# Patient Record
Sex: FEMALE | Race: WHITE | Employment: OTHER | URBAN - METROPOLITAN AREA
[De-identification: names, ages, dates, MRNs, and addresses within clinical notes are randomized per-mention and may not be internally consistent; named-entity substitution may affect disease eponyms.]

---

## 2018-04-25 ENCOUNTER — OFFICE VISIT (OUTPATIENT)
Dept: NON INVASIVE DIAGNOSTICS | Age: 73
End: 2018-04-25
Payer: MEDICARE

## 2018-04-25 ENCOUNTER — TELEPHONE (OUTPATIENT)
Dept: NON INVASIVE DIAGNOSTICS | Age: 73
End: 2018-04-25

## 2018-04-25 VITALS
DIASTOLIC BLOOD PRESSURE: 64 MMHG | HEIGHT: 67 IN | BODY MASS INDEX: 32.18 KG/M2 | SYSTOLIC BLOOD PRESSURE: 120 MMHG | HEART RATE: 74 BPM | WEIGHT: 205 LBS | RESPIRATION RATE: 18 BRPM

## 2018-04-25 DIAGNOSIS — Z95.0 PACEMAKER: Primary | ICD-10-CM

## 2018-04-25 DIAGNOSIS — I48.0 PAROXYSMAL ATRIAL FIBRILLATION (HCC): ICD-10-CM

## 2018-04-25 PROCEDURE — 99213 OFFICE O/P EST LOW 20 MIN: CPT | Performed by: NURSE PRACTITIONER

## 2018-04-25 PROCEDURE — 93280 PM DEVICE PROGR EVAL DUAL: CPT | Performed by: INTERNAL MEDICINE

## 2018-10-26 ENCOUNTER — TELEPHONE (OUTPATIENT)
Dept: NON INVASIVE DIAGNOSTICS | Age: 73
End: 2018-10-26

## 2018-10-26 ENCOUNTER — NURSE ONLY (OUTPATIENT)
Dept: NON INVASIVE DIAGNOSTICS | Age: 73
End: 2018-10-26
Payer: MEDICARE

## 2018-10-26 DIAGNOSIS — I44.2 CHB (COMPLETE HEART BLOCK) (HCC): ICD-10-CM

## 2018-10-26 DIAGNOSIS — Z95.0 PACEMAKER: ICD-10-CM

## 2018-10-26 DIAGNOSIS — I48.0 PAROXYSMAL ATRIAL FIBRILLATION (HCC): Primary | ICD-10-CM

## 2018-10-26 PROCEDURE — 93296 REM INTERROG EVL PM/IDS: CPT | Performed by: INTERNAL MEDICINE

## 2018-10-26 PROCEDURE — 93294 REM INTERROG EVL PM/LDLS PM: CPT | Performed by: INTERNAL MEDICINE

## 2018-10-26 NOTE — TELEPHONE ENCOUNTER
Patients  called for notification that he sent [facility] remote for wife. Advised it was received - next transmission 1-28-19 .  After review, if any other notifications to patient- call [address] per  request.

## 2018-10-29 NOTE — PROGRESS NOTES
See [facility] [place] report. Remote monitoring reviewed over a 90 day period. End of 90 day monitoring period date of service 10-26-18. Liana Stallworth

## 2019-01-28 ENCOUNTER — NURSE ONLY (OUTPATIENT)
Dept: NON INVASIVE DIAGNOSTICS | Age: 74
End: 2019-01-28

## 2019-01-30 ENCOUNTER — NURSE ONLY (OUTPATIENT)
Dept: NON INVASIVE DIAGNOSTICS | Age: 74
End: 2019-01-30
Payer: MEDICARE

## 2019-01-30 DIAGNOSIS — Z95.0 PACEMAKER: ICD-10-CM

## 2019-01-30 DIAGNOSIS — I44.2 CHB (COMPLETE HEART BLOCK) (HCC): Primary | ICD-10-CM

## 2019-01-30 PROCEDURE — 93296 REM INTERROG EVL PM/IDS: CPT | Performed by: INTERNAL MEDICINE

## 2019-01-30 PROCEDURE — 93294 REM INTERROG EVL PM/LDLS PM: CPT | Performed by: INTERNAL MEDICINE

## 2019-05-20 ENCOUNTER — NURSE ONLY (OUTPATIENT)
Dept: NON INVASIVE DIAGNOSTICS | Age: 74
End: 2019-05-20
Payer: MEDICARE

## 2019-05-20 DIAGNOSIS — Z95.0 PACEMAKER: Primary | ICD-10-CM

## 2019-05-20 DIAGNOSIS — I44.2 CHB (COMPLETE HEART BLOCK) (HCC): ICD-10-CM

## 2019-05-20 PROCEDURE — 93294 REM INTERROG EVL PM/LDLS PM: CPT | Performed by: INTERNAL MEDICINE

## 2019-05-20 PROCEDURE — 93296 REM INTERROG EVL PM/IDS: CPT | Performed by: INTERNAL MEDICINE

## 2019-06-05 ENCOUNTER — TELEPHONE (OUTPATIENT)
Dept: NON INVASIVE DIAGNOSTICS | Age: 74
End: 2019-06-05

## 2019-06-05 NOTE — TELEPHONE ENCOUNTER
We have received your remote transmission. Our staff will contact you if there is anything that needs to be discussed.  Your next appointment is 08/19/2019 remote transmission from home    Pt is non-wireless

## 2019-06-05 NOTE — PROGRESS NOTES
See [facility] [place] report. Remote monitoring reviewed over a 90 day period. End of 90 day monitoring period date of service 5-20-19.

## 2019-08-26 ENCOUNTER — TELEPHONE (OUTPATIENT)
Dept: NON INVASIVE DIAGNOSTICS | Age: 74
End: 2019-08-26

## 2019-08-26 ENCOUNTER — NURSE ONLY (OUTPATIENT)
Dept: NON INVASIVE DIAGNOSTICS | Age: 74
End: 2019-08-26
Payer: MEDICARE

## 2019-08-26 DIAGNOSIS — I44.2 CHB (COMPLETE HEART BLOCK) (HCC): ICD-10-CM

## 2019-08-26 DIAGNOSIS — Z95.0 PACEMAKER: Primary | ICD-10-CM

## 2019-08-26 PROCEDURE — 93294 REM INTERROG EVL PM/LDLS PM: CPT | Performed by: INTERNAL MEDICINE

## 2019-08-26 PROCEDURE — 93296 REM INTERROG EVL PM/IDS: CPT | Performed by: INTERNAL MEDICINE

## 2019-08-26 NOTE — TELEPHONE ENCOUNTER
Spoke to the nurse at [facility] about her missed transmission on 08/19/2019 and she said that she would try sending it today

## 2019-09-12 ENCOUNTER — TELEPHONE (OUTPATIENT)
Dept: NON INVASIVE DIAGNOSTICS | Age: 74
End: 2019-09-12

## 2019-10-08 ENCOUNTER — OFFICE VISIT (OUTPATIENT)
Dept: NON INVASIVE DIAGNOSTICS | Age: 74
End: 2019-10-08
Payer: MEDICARE

## 2019-10-08 VITALS
HEIGHT: 67 IN | DIASTOLIC BLOOD PRESSURE: 60 MMHG | HEART RATE: 61 BPM | SYSTOLIC BLOOD PRESSURE: 106 MMHG | BODY MASS INDEX: 32.11 KG/M2 | RESPIRATION RATE: 18 BRPM

## 2019-10-08 DIAGNOSIS — I48.0 PAROXYSMAL ATRIAL FIBRILLATION (HCC): ICD-10-CM

## 2019-10-08 DIAGNOSIS — I44.2 CHB (COMPLETE HEART BLOCK) (HCC): ICD-10-CM

## 2019-10-08 DIAGNOSIS — Z95.0 PACEMAKER: Primary | ICD-10-CM

## 2019-10-08 PROCEDURE — 99214 OFFICE O/P EST MOD 30 MIN: CPT | Performed by: INTERNAL MEDICINE

## 2019-10-08 PROCEDURE — 93280 PM DEVICE PROGR EVAL DUAL: CPT | Performed by: INTERNAL MEDICINE

## 2019-10-10 ENCOUNTER — TELEPHONE (OUTPATIENT)
Dept: ADMINISTRATIVE | Age: 74
End: 2019-10-10

## 2019-10-29 ENCOUNTER — TELEPHONE (OUTPATIENT)
Dept: NON INVASIVE DIAGNOSTICS | Age: 74
End: 2019-10-29

## 2019-10-30 ENCOUNTER — ANESTHESIA (OUTPATIENT)
Dept: CARDIAC CATH/INVASIVE PROCEDURES | Age: 74
End: 2019-10-30

## 2019-10-30 ENCOUNTER — ANESTHESIA EVENT (OUTPATIENT)
Dept: CARDIAC CATH/INVASIVE PROCEDURES | Age: 74
End: 2019-10-30

## 2019-10-30 ENCOUNTER — HOSPITAL ENCOUNTER (OUTPATIENT)
Dept: CARDIAC CATH/INVASIVE PROCEDURES | Age: 74
Discharge: HOME | End: 2019-10-30
Payer: MEDICARE

## 2019-10-30 VITALS — OXYGEN SATURATION: 98 % | SYSTOLIC BLOOD PRESSURE: 141 MMHG | DIASTOLIC BLOOD PRESSURE: 59 MMHG

## 2019-10-30 VITALS
BODY MASS INDEX: 34.21 KG/M2 | RESPIRATION RATE: 20 BRPM | HEIGHT: 67 IN | TEMPERATURE: 97.1 F | HEART RATE: 65 BPM | WEIGHT: 218 LBS | DIASTOLIC BLOOD PRESSURE: 55 MMHG | SYSTOLIC BLOOD PRESSURE: 138 MMHG

## 2019-10-30 DIAGNOSIS — Z45.010 ENCOUNTER FOR PACEMAKER AT END OF BATTERY LIFE: ICD-10-CM

## 2019-10-30 LAB
ANION GAP SERPL CALCULATED.3IONS-SCNC: 9 MMOL/L (ref 7–16)
BASOPHILS ABSOLUTE: 0.05 E9/L (ref 0–0.2)
BASOPHILS RELATIVE PERCENT: 1 % (ref 0–2)
BUN BLDV-MCNC: 17 MG/DL (ref 8–23)
CALCIUM SERPL-MCNC: 9.4 MG/DL (ref 8.6–10.2)
CHLORIDE BLD-SCNC: 101 MMOL/L (ref 98–107)
CO2: 30 MMOL/L (ref 22–29)
CREAT SERPL-MCNC: 0.6 MG/DL (ref 0.5–1)
EOSINOPHILS ABSOLUTE: 0.22 E9/L (ref 0.05–0.5)
EOSINOPHILS RELATIVE PERCENT: 4.2 % (ref 0–6)
GFR AFRICAN AMERICAN: >60
GFR NON-AFRICAN AMERICAN: >60 ML/MIN/1.73
GLUCOSE BLD-MCNC: 103 MG/DL (ref 74–99)
HCT VFR BLD CALC: 43.8 % (ref 34–48)
HEMOGLOBIN: 13.9 G/DL (ref 11.5–15.5)
IMMATURE GRANULOCYTES #: 0.01 E9/L
IMMATURE GRANULOCYTES %: 0.2 % (ref 0–5)
INR BLD: 2.1
LYMPHOCYTES ABSOLUTE: 1.62 E9/L (ref 1.5–4)
LYMPHOCYTES RELATIVE PERCENT: 31.3 % (ref 20–42)
MCH RBC QN AUTO: 30.6 PG (ref 26–35)
MCHC RBC AUTO-ENTMCNC: 31.7 % (ref 32–34.5)
MCV RBC AUTO: 96.5 FL (ref 80–99.9)
MONOCYTES ABSOLUTE: 0.37 E9/L (ref 0.1–0.95)
MONOCYTES RELATIVE PERCENT: 7.1 % (ref 2–12)
NEUTROPHILS ABSOLUTE: 2.91 E9/L (ref 1.8–7.3)
NEUTROPHILS RELATIVE PERCENT: 56.2 % (ref 43–80)
PDW BLD-RTO: 12.7 FL (ref 11.5–15)
PLATELET # BLD: 243 E9/L (ref 130–450)
PMV BLD AUTO: 10.6 FL (ref 7–12)
POTASSIUM SERPL-SCNC: 4 MMOL/L (ref 3.5–5)
PROTHROMBIN TIME: 24.5 SEC (ref 9.3–12.4)
RBC # BLD: 4.54 E12/L (ref 3.5–5.5)
SODIUM BLD-SCNC: 140 MMOL/L (ref 132–146)
WBC # BLD: 5.2 E9/L (ref 4.5–11.5)

## 2019-10-30 PROCEDURE — 33228 REMV&REPLC PM GEN DUAL LEAD: CPT | Performed by: INTERNAL MEDICINE

## 2019-10-30 PROCEDURE — C1785 PMKR, DUAL, RATE-RESP: HCPCS

## 2019-10-30 PROCEDURE — 2780000010 HC IMPLANT OTHER

## 2019-10-30 PROCEDURE — 85610 PROTHROMBIN TIME: CPT

## 2019-10-30 PROCEDURE — 85025 COMPLETE CBC W/AUTO DIFF WBC: CPT

## 2019-10-30 PROCEDURE — 6360000002 HC RX W HCPCS

## 2019-10-30 PROCEDURE — 3700000000 HC ANESTHESIA ATTENDED CARE

## 2019-10-30 PROCEDURE — 2709999900 HC NON-CHARGEABLE SUPPLY

## 2019-10-30 PROCEDURE — 2580000003 HC RX 258: Performed by: INTERNAL MEDICINE

## 2019-10-30 PROCEDURE — 36415 COLL VENOUS BLD VENIPUNCTURE: CPT

## 2019-10-30 PROCEDURE — C1732 CATH, EP, DIAG/ABL, 3D/VECT: HCPCS

## 2019-10-30 PROCEDURE — 80048 BASIC METABOLIC PNL TOTAL CA: CPT

## 2019-10-30 PROCEDURE — 2580000003 HC RX 258

## 2019-10-30 PROCEDURE — 3700000001 HC ADD 15 MINUTES (ANESTHESIA)

## 2019-10-30 PROCEDURE — 2500000003 HC RX 250 WO HCPCS

## 2019-10-30 PROCEDURE — 6360000002 HC RX W HCPCS: Performed by: NURSE ANESTHETIST, CERTIFIED REGISTERED

## 2019-10-30 PROCEDURE — 2720000010 HC SURG SUPPLY STERILE

## 2019-10-30 RX ORDER — SODIUM CHLORIDE 0.9 % (FLUSH) 0.9 %
10 SYRINGE (ML) INJECTION EVERY 12 HOURS SCHEDULED
Status: CANCELLED | OUTPATIENT
Start: 2019-10-30

## 2019-10-30 RX ORDER — CEFAZOLIN SODIUM 1 G/3ML
INJECTION, POWDER, FOR SOLUTION INTRAMUSCULAR; INTRAVENOUS PRN
Status: DISCONTINUED | OUTPATIENT
Start: 2019-10-30 | End: 2019-10-30 | Stop reason: SDUPTHER

## 2019-10-30 RX ORDER — CEPHALEXIN 500 MG/1
500 CAPSULE ORAL 2 TIMES DAILY
Qty: 14 CAPSULE | Refills: 0 | Status: SHIPPED | OUTPATIENT
Start: 2019-10-30 | End: 2019-11-06

## 2019-10-30 RX ORDER — PHENYLEPHRINE HYDROCHLORIDE 10 MG/ML
INJECTION INTRAVENOUS PRN
Status: DISCONTINUED | OUTPATIENT
Start: 2019-10-30 | End: 2019-10-30 | Stop reason: SDUPTHER

## 2019-10-30 RX ORDER — PROPOFOL 10 MG/ML
INJECTION, EMULSION INTRAVENOUS CONTINUOUS PRN
Status: DISCONTINUED | OUTPATIENT
Start: 2019-10-30 | End: 2019-10-30 | Stop reason: SDUPTHER

## 2019-10-30 RX ORDER — SODIUM CHLORIDE 9 MG/ML
INJECTION, SOLUTION INTRAVENOUS ONCE
Status: COMPLETED | OUTPATIENT
Start: 2019-10-30 | End: 2019-10-30

## 2019-10-30 RX ORDER — ACETAMINOPHEN 325 MG/1
650 TABLET ORAL EVERY 4 HOURS PRN
Status: CANCELLED | OUTPATIENT
Start: 2019-10-30

## 2019-10-30 RX ORDER — SODIUM CHLORIDE 0.9 % (FLUSH) 0.9 %
10 SYRINGE (ML) INJECTION PRN
Status: CANCELLED | OUTPATIENT
Start: 2019-10-30

## 2019-10-30 RX ORDER — FENTANYL CITRATE 50 UG/ML
INJECTION, SOLUTION INTRAMUSCULAR; INTRAVENOUS PRN
Status: DISCONTINUED | OUTPATIENT
Start: 2019-10-30 | End: 2019-10-30 | Stop reason: SDUPTHER

## 2019-10-30 RX ADMIN — PHENYLEPHRINE HYDROCHLORIDE 50 MCG: 10 INJECTION INTRAVENOUS at 11:42

## 2019-10-30 RX ADMIN — CEFAZOLIN 2000 MG: 1 INJECTION, POWDER, FOR SOLUTION INTRAMUSCULAR; INTRAVENOUS at 11:23

## 2019-10-30 RX ADMIN — FENTANYL CITRATE 25 MCG: 50 INJECTION, SOLUTION INTRAMUSCULAR; INTRAVENOUS at 11:30

## 2019-10-30 RX ADMIN — PHENYLEPHRINE HYDROCHLORIDE 100 MCG: 10 INJECTION INTRAVENOUS at 12:15

## 2019-10-30 RX ADMIN — PHENYLEPHRINE HYDROCHLORIDE 100 MCG: 10 INJECTION INTRAVENOUS at 12:03

## 2019-10-30 RX ADMIN — FENTANYL CITRATE 50 MCG: 50 INJECTION, SOLUTION INTRAMUSCULAR; INTRAVENOUS at 11:20

## 2019-10-30 RX ADMIN — PROPOFOL 20 MCG/KG/MIN: 10 INJECTION, EMULSION INTRAVENOUS at 11:20

## 2019-10-30 RX ADMIN — SODIUM CHLORIDE: 9 INJECTION, SOLUTION INTRAVENOUS at 09:03

## 2019-10-30 RX ADMIN — PHENYLEPHRINE HYDROCHLORIDE 50 MCG: 10 INJECTION INTRAVENOUS at 11:45

## 2019-10-30 ASSESSMENT — PULMONARY FUNCTION TESTS
PIF_VALUE: 11
PIF_VALUE: 0
PIF_VALUE: 11
PIF_VALUE: 12
PIF_VALUE: 11
PIF_VALUE: 12
PIF_VALUE: 1
PIF_VALUE: 0
PIF_VALUE: 11
PIF_VALUE: 12
PIF_VALUE: 11
PIF_VALUE: 12
PIF_VALUE: 11
PIF_VALUE: 12
PIF_VALUE: 11
PIF_VALUE: 12
PIF_VALUE: 11
PIF_VALUE: 11

## 2019-11-01 ENCOUNTER — TELEPHONE (OUTPATIENT)
Dept: CARDIOLOGY | Age: 74
End: 2019-11-01

## 2019-11-13 ENCOUNTER — NURSE ONLY (OUTPATIENT)
Dept: NON INVASIVE DIAGNOSTICS | Age: 74
End: 2019-11-13
Payer: MEDICARE

## 2019-11-13 DIAGNOSIS — Z95.0 PACEMAKER: Primary | ICD-10-CM

## 2019-11-13 DIAGNOSIS — I44.2 CHB (COMPLETE HEART BLOCK) (HCC): ICD-10-CM

## 2019-11-13 PROCEDURE — 93280 PM DEVICE PROGR EVAL DUAL: CPT | Performed by: INTERNAL MEDICINE

## 2019-12-03 ENCOUNTER — NURSE ONLY (OUTPATIENT)
Dept: NON INVASIVE DIAGNOSTICS | Age: 74
End: 2019-12-03

## 2020-02-17 ENCOUNTER — NURSE ONLY (OUTPATIENT)
Dept: NON INVASIVE DIAGNOSTICS | Age: 75
End: 2020-02-17
Payer: MEDICARE

## 2020-02-17 PROCEDURE — 93296 REM INTERROG EVL PM/IDS: CPT | Performed by: INTERNAL MEDICINE

## 2020-02-17 PROCEDURE — 93294 REM INTERROG EVL PM/LDLS PM: CPT | Performed by: INTERNAL MEDICINE

## 2020-02-21 NOTE — PROGRESS NOTES
See [facility] [place] report. Remote monitoring reviewed over a 90 day period. End of 90 day monitoring period date of service 2.18.2020.

## 2020-05-19 ENCOUNTER — NURSE ONLY (OUTPATIENT)
Dept: NON INVASIVE DIAGNOSTICS | Age: 75
End: 2020-05-19
Payer: MEDICARE

## 2020-05-19 PROCEDURE — 93294 REM INTERROG EVL PM/LDLS PM: CPT | Performed by: INTERNAL MEDICINE

## 2020-05-19 PROCEDURE — 93296 REM INTERROG EVL PM/IDS: CPT | Performed by: INTERNAL MEDICINE

## 2020-07-15 ENCOUNTER — TELEPHONE (OUTPATIENT)
Dept: ADMINISTRATIVE | Age: 75
End: 2020-07-15

## 2020-07-15 NOTE — TELEPHONE ENCOUNTER
[address] calling about pt, she was scheduled for appt w/ Dr Bryan Dao 7/27, would like to know is VV would be appropriate for this pt, call FELIPA Martinez at [phone] with questions or to schedule

## 2020-07-29 NOTE — TELEPHONE ENCOUNTER
Returned a call to [employer]. They would like to schedule a VV with Dr. Diallo Vick. I had to leave a message with the  for some to call back us back.

## 2020-08-18 ENCOUNTER — NURSE ONLY (OUTPATIENT)
Dept: NON INVASIVE DIAGNOSTICS | Age: 75
End: 2020-08-18
Payer: MEDICARE

## 2020-08-18 PROCEDURE — 93294 REM INTERROG EVL PM/LDLS PM: CPT | Performed by: INTERNAL MEDICINE

## 2020-08-18 PROCEDURE — 93296 REM INTERROG EVL PM/IDS: CPT | Performed by: INTERNAL MEDICINE

## 2020-08-18 NOTE — PROGRESS NOTES
See [facility] [place] report. Remote monitoring reviewed over a 90 day period.   End of 90 day monitoring period date of service 08/18/2020

## 2020-11-17 ENCOUNTER — NURSE ONLY (OUTPATIENT)
Dept: NON INVASIVE DIAGNOSTICS | Age: 75
End: 2020-11-17
Payer: MEDICARE

## 2020-11-17 PROCEDURE — 93296 REM INTERROG EVL PM/IDS: CPT | Performed by: INTERNAL MEDICINE

## 2020-11-17 PROCEDURE — 93294 REM INTERROG EVL PM/LDLS PM: CPT | Performed by: INTERNAL MEDICINE

## 2021-02-16 ENCOUNTER — NURSE ONLY (OUTPATIENT)
Dept: NON INVASIVE DIAGNOSTICS | Age: 76
End: 2021-02-16
Payer: MEDICARE

## 2021-02-16 DIAGNOSIS — I44.2 CHB (COMPLETE HEART BLOCK) (HCC): ICD-10-CM

## 2021-02-16 DIAGNOSIS — I48.0 PAROXYSMAL ATRIAL FIBRILLATION (HCC): ICD-10-CM

## 2021-02-16 DIAGNOSIS — Z95.0 PACEMAKER: Primary | ICD-10-CM

## 2021-02-16 PROCEDURE — 93294 REM INTERROG EVL PM/LDLS PM: CPT | Performed by: INTERNAL MEDICINE

## 2021-02-16 PROCEDURE — 93296 REM INTERROG EVL PM/IDS: CPT | Performed by: INTERNAL MEDICINE

## 2021-03-04 NOTE — PROGRESS NOTES
See [facility] [place] report. Remote monitoring reviewed over a 90 day period. End of 90 day monitoring period date of service 2.16.2021.

## 2021-05-18 ENCOUNTER — NURSE ONLY (OUTPATIENT)
Dept: NON INVASIVE DIAGNOSTICS | Age: 76
End: 2021-05-18
Payer: MEDICARE

## 2021-05-18 DIAGNOSIS — Z95.0 PACEMAKER: ICD-10-CM

## 2021-05-18 DIAGNOSIS — I44.2 CHB (COMPLETE HEART BLOCK) (HCC): Primary | ICD-10-CM

## 2021-06-30 NOTE — PROGRESS NOTES
Remote transmission results:    Remote monitoring reviewed over a 90 day period.   End of 90 day monitoring period date of service 5/18/21      Make/Model MDT Isis MOLINA DR MRI  Mode DDIR 60/120 ppm  Presenting rhythm: SR, ApVp, HR 60 ppm  P wave: 1.1 mV  Impedance: 380 ohms   Threshold: not measured  RV R wave: paced   ms mV  Impedance: 513 ohms   Threshold: 0.875 V @ 0.4 ms  Pacing: A: 93.5%  RV: 99.8%    Battery Voltage/Longevity:  8.6 years    Arrhythmias: AF burden 2.1%  -5/08/21: PAF throughout day VHR 60-78 bpm  -5/09/21: AF most of day VHR 60s bpm  -5/10/21: AF 11 hours VHR 60s bpm    Medications  Flecainide 50 mg BID  Lopressor 100 mg BID  Norvasc 10 mg QD  Lasix 40 mg BID  Apresoline 25 mg Q8  Hours  Lisinopril 40 mg QD  Coumadin as directed    Comment  [facility] resident (?)  Persistent AF  H/O Tikosyn, amiodarone, Rythmol intolerance  On Flecainide AAD therapy  CHB  EP outpatient follow-up pending  Remote 8/17/21    ISIDRO Kramer-CNP, [address] Electrophysiology  [address] [facility]

## 2021-07-01 ENCOUNTER — TELEPHONE (OUTPATIENT)
Dept: NON INVASIVE DIAGNOSTICS | Age: 76
End: 2021-07-01

## 2021-07-06 PROCEDURE — 93294 REM INTERROG EVL PM/LDLS PM: CPT | Performed by: INTERNAL MEDICINE

## 2021-07-06 PROCEDURE — 93296 REM INTERROG EVL PM/IDS: CPT | Performed by: INTERNAL MEDICINE

## 2021-08-10 ENCOUNTER — VIRTUAL VISIT (OUTPATIENT)
Dept: NON INVASIVE DIAGNOSTICS | Age: 76
End: 2021-08-10
Payer: MEDICARE

## 2021-08-10 DIAGNOSIS — E66.8 MODERATE OBESITY: ICD-10-CM

## 2021-08-10 DIAGNOSIS — Z95.0 PACEMAKER: Primary | ICD-10-CM

## 2021-08-10 DIAGNOSIS — I44.2 CHB (COMPLETE HEART BLOCK) (HCC): ICD-10-CM

## 2021-08-10 DIAGNOSIS — R94.31 ABNORMAL EKG: ICD-10-CM

## 2021-08-10 DIAGNOSIS — I48.0 PAROXYSMAL ATRIAL FIBRILLATION (HCC): ICD-10-CM

## 2021-08-10 PROBLEM — J96.01 ACUTE RESPIRATORY FAILURE WITH HYPOXIA (HCC): Status: ACTIVE | Noted: 2017-06-17

## 2021-08-10 PROBLEM — I82.409 DVT (DEEP VENOUS THROMBOSIS) (HCC): Status: ACTIVE | Noted: 2017-06-11

## 2021-08-10 PROBLEM — A04.72 C. DIFFICILE DIARRHEA: Status: ACTIVE | Noted: 2017-06-11

## 2021-08-10 PROCEDURE — 99214 OFFICE O/P EST MOD 30 MIN: CPT | Performed by: INTERNAL MEDICINE

## 2021-08-10 PROCEDURE — 93280 PM DEVICE PROGR EVAL DUAL: CPT | Performed by: INTERNAL MEDICINE

## 2021-08-10 RX ORDER — HYDRALAZINE HYDROCHLORIDE 50 MG/1
TABLET, FILM COATED ORAL
COMMUNITY
Start: 2021-07-31

## 2021-08-10 RX ORDER — WARFARIN SODIUM 3 MG/1
TABLET ORAL
COMMUNITY
Start: 2021-07-21

## 2021-08-10 NOTE — PROGRESS NOTES
8/10/2021    TELEHEALTH EVALUATION -- Audio/Visual (During UCFMI-09 public health emergency)    HPI:    Bebo Samuels (:  1945) has requested an audio/video evaluation for the following concern(s): Pacemaker    She has hx of CHB, persistent AF. She has D-PPM s/p generator change in 2019. She has been tikosyn, amiodarone and propafenone intolerant, current flecainide AAD therapy. She had an AF ablation procedure at [facility] in 2015. She resides at [facility]  And does remote transmissions. She remains on coumadin for [facility] with no reported bleeding issues. Her family was present during interview.       She denies any chest pain, sob, dizziness, syncope. Review of Systems     General: No unusual weight gain, change in exercise tolerance  Skin: No rash or itching  EENT: No vision changes or nosebleeds  Cardiovascular: No orthopnea or paroxysmal nocturnal dyspnea, neg chest pain, palpitation  Respiratory: neg Cough  and sob  Gastrointestinal: No hematemesis or recent changes in bowel habits  Genitourinary: No hematuria, urgency or frequency  Musculoskeletal: No muscular weakness or joint swelling   Neurologic / Psychiatric: No incoordination or convulsions  Allergic / Immunologic/ Lymphatic / Endocrine: No anemia or bleeding tendency      Prior to Visit Medications    Medication Sig Taking?  Authorizing Provider   hydrALAZINE (APRESOLINE) 50 MG tablet  Yes Historical Provider, MD   warfarin (COUMADIN) 3 MG tablet  Yes Historical Provider, MD   magnesium hydroxide (MILK OF MAGNESIA) 400 MG/5ML suspension Take by mouth daily as needed for Constipation Yes Historical Provider, MD   camphor-menthol-methyl salicylate (SUSHANT HOYOS ULTRA STRENGTH) 4-10-30 % CREA cream Apply topically 3 times daily as needed for Pain Yes Historical Provider, MD   menthol-methyl salicylate (BENGAY ARTHRITIS) 8-30 % CREA cream Apply 1 applicator topically every 6 hours as needed for Pain Yes Historical Provider, MD gabapentin (NEURONTIN) 300 MG capsule Take 300 mg by mouth nightly Yes Historical Provider, MD   loratadine (CLARITIN) 10 MG capsule Take 10 mg by mouth daily Yes Historical Provider, MD   insulin NPH (HUMULIN N;NOVOLIN N) 100 UNIT/ML injection vial Inject into the skin 4 times daily (after meals and at bedtime) 160 -200 =2 units 201-250 = 4   251-300 = 6   301-350 = 8  351- 400 = 10 Yes Historical Provider, MD   acetaminophen (TYLENOL) 325 MG tablet 650 mg by Per G Tube route every 4 hours as needed for Pain or Fever Yes Historical Provider, MD   insulin glargine (LANTUS) 100 UNIT/ML injection vial Inject 30 Units into the skin nightly  Patient taking differently: Inject 15 Units into the skin nightly  Yes Erwin Walsh MD   hydrALAZINE (APRESOLINE) 25 MG tablet Take 1 tablet by mouth every 8 hours  Patient taking differently: 75 mg by Per G Tube route every 8 hours  Yes Erwin Walsh MD   metoprolol tartrate (LOPRESSOR) 100 MG tablet 1 tablet by PEG Tube route 2 times daily Yes Erwin Walsh MD   amLODIPine (NORVASC) 10 MG tablet 1 tablet by PEG Tube route daily Yes Erwin Walsh MD   furosemide (LASIX) 20 MG tablet Take 1 tablet by mouth 2 times daily  Patient taking differently: 40 mg by Per G Tube route 2 times daily  Yes Erwin Walsh MD   Multiple Vitamins-Minerals (THERAPEUTIC MULTIVITAMIN-MINERALS) tablet 1 tablet by Per G Tube route daily  Yes Historical Provider, MD   flecainide (TAMBOCOR) 50 MG tablet Take 1 tablet by mouth 2 times daily  Patient taking differently: 50 mg by Per G Tube route 2 times daily  Yes Shannan Ag DO   lisinopril (PRINIVIL;ZESTRIL) 40 MG tablet 40 mg by Per G Tube route daily  Yes Historical Provider, MD   lovastatin (MEVACOR) 40 MG tablet 40 mg by Per G Tube route nightly  Yes Historical Provider, MD   Menthol-Zinc Oxide (CHAMOSYN) 0.45-20 % OINT Apply topically  Patient not taking: Reported on 8/10/2021  Historical Provider, MD TeleHealth encounter (During EDCCA-98 public health emergency), evaluation of the following organ systems was limited: Vitals/Constitutional/EENT/Resp/CV/GI//MS/Neuro/Skin/Heme-Lymph-Imm. Pursuant to the emergency declaration under the [address] [place] [address], [address] [place] [address] and the [employer] and [employer] Act, this Virtual Visit was conducted with patient's (and/or legal guardian's) consent, to reduce the patient's risk of exposure to COVID-19 and provide necessary medical care. The patient (and/or legal guardian) has also been advised to contact this office for worsening conditions or problems, and seek emergency medical treatment and/or call 911 if deemed necessary. Services were provided through a phone/video synchronous discussion virtually to substitute for in-person clinic visit. Patient and provider were located at their individual homes. --Papa Radford MD on 8/10/2021 at 11:12 AM    An electronic signature was used to authenticate this note.

## 2023-07-10 NOTE — PROGRESS NOTES
[facility] CARDIOLOGY  CARDIAC ELECTROPHYSIOLOGY DEPARTMENT/DIVISION OF CARDIOLOGY  Outpatient Progress Report  PATIENT: [id number] RECORD NUMBER: [id number]  DATE OF SERVICE:  7/10/2023  ATTENDING ELECTROPHYSIOLOGIST:  Wilda Chapman D.O.  REFERRING PHYSICIAN: No ref. provider found and Sayda Fatima DO  CHIEF COMPLAINT: PPM insitu    HPI: Shalini Kelly is a 70 y.o. 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 She is managed by PCP with bumex 2 mg daily, flecainide 50 mg BID, lovastatin 40 mg daily, and VKA. In 2009, she was diagnosed with 3* AV block, which was treated with MDT dc PPM at [facility]. In 2013 or earlier, she was diagnosed with AF, which was treated with [address] and tikosyn. She was reportedly intolerant to propafenone and amiodarone. In 2015, she had AF cryoablation at [facility]. LSPV: -57 (180s)   LIPV: -52 (180s), -44 (180s)   RIPV: -45 (156s)   RSPV: -46 (180s)   At the time of ablation, she was noted to have extensive scarring of the LA. After cryo PVI, patient had AFL, which was treated with posterior wall line. Additionally, the LIPV was noted to recover conduction and was treated with RFA. In 2016, Lexus Paramjit stopped due to Qtc prolongation and flecainide started. In 2017, her PPM was reprogrammed from DDDR to [address] due to avoid tracking of atrial tachycardia. Around that time, she had a stroke and has been wheelchair bound since. She resides at [facility]. She presents today, 7/14/23; to transfer EP care to my office.  Her device is enrolled in remote monitoring, which most recently reported RA pacing = 48.33%, RV pacing = 96.88%, and

## 2023-07-14 ENCOUNTER — OFFICE VISIT (OUTPATIENT)
Dept: NON INVASIVE DIAGNOSTICS | Age: 78
End: 2023-07-14

## 2023-07-14 VITALS
BODY MASS INDEX: 32.74 KG/M2 | DIASTOLIC BLOOD PRESSURE: 72 MMHG | RESPIRATION RATE: 16 BRPM | WEIGHT: 208.6 LBS | HEIGHT: 67 IN | SYSTOLIC BLOOD PRESSURE: 112 MMHG

## 2023-07-14 DIAGNOSIS — I44.2 CHB (COMPLETE HEART BLOCK) (HCC): ICD-10-CM

## 2023-07-14 DIAGNOSIS — Z95.0 PACEMAKER: Primary | ICD-10-CM

## 2023-07-14 DIAGNOSIS — I48.0 PAROXYSMAL ATRIAL FIBRILLATION (HCC): ICD-10-CM

## 2023-07-14 PROBLEM — I69.30 HISTORY OF CEREBROVASCULAR ACCIDENT (CVA) WITH RESIDUAL DEFICIT: Status: ACTIVE | Noted: 2023-07-14

## 2023-07-14 PROBLEM — J98.01 ACUTE BRONCHOSPASM: Status: ACTIVE | Noted: 2023-07-14

## 2023-07-14 PROBLEM — E83.42 HYPOMAGNESEMIA: Status: ACTIVE | Noted: 2023-07-14

## 2023-07-14 PROBLEM — J81.1 PULMONARY EDEMA: Status: ACTIVE | Noted: 2023-07-14

## 2023-07-14 PROBLEM — R93.89 ABNORMAL COMPUTERIZED AXIAL TOMOGRAPHY OF CHEST: Status: ACTIVE | Noted: 2023-07-14

## 2023-07-14 PROBLEM — R82.90 ABNORMAL URINALYSIS: Status: ACTIVE | Noted: 2023-07-14

## 2023-07-14 PROBLEM — D72.829 LEUKOCYTOSIS: Status: ACTIVE | Noted: 2023-07-14

## 2023-07-14 PROBLEM — I50.31 ACUTE DIASTOLIC HEART FAILURE (HCC): Status: ACTIVE | Noted: 2023-07-14

## 2023-07-14 PROBLEM — R41.82 ALTERED MENTAL STATUS: Status: ACTIVE | Noted: 2023-07-14

## 2023-07-14 PROBLEM — S37.009A INJURY OF KIDNEY: Status: ACTIVE | Noted: 2023-07-14

## 2023-07-14 PROBLEM — N39.0 SEPSIS DUE TO URINARY TRACT INFECTION (HCC): Status: ACTIVE | Noted: 2023-07-14

## 2023-07-14 PROBLEM — R50.9 FEVER: Status: ACTIVE | Noted: 2023-07-14

## 2023-07-14 PROBLEM — A41.9 SEPSIS DUE TO URINARY TRACT INFECTION (HCC): Status: ACTIVE | Noted: 2023-07-14

## 2023-07-14 PROBLEM — D68.59 HYPERCOAGULABLE STATE (HCC): Status: ACTIVE | Noted: 2023-07-14

## 2023-07-14 PROBLEM — N39.0 ACUTE URINARY TRACT INFECTION: Status: ACTIVE | Noted: 2023-07-14

## 2023-07-14 PROBLEM — K59.00 CONSTIPATION: Status: ACTIVE | Noted: 2023-07-14

## 2023-07-14 PROBLEM — E55.9 VITAMIN D DEFICIENCY: Status: ACTIVE | Noted: 2023-07-14

## 2023-07-14 PROBLEM — T68.XXXA HYPOTHERMIA: Status: ACTIVE | Noted: 2023-07-14

## 2023-07-14 PROBLEM — E83.39 HYPOPHOSPHATEMIA: Status: ACTIVE | Noted: 2023-07-14

## 2023-07-14 PROBLEM — E87.0 HYPERNATREMIA: Status: ACTIVE | Noted: 2023-07-14

## 2023-07-14 PROBLEM — K52.89 STERCORAL COLITIS: Status: ACTIVE | Noted: 2023-07-14

## 2023-07-14 PROBLEM — R09.02 HYPOXIA: Status: ACTIVE | Noted: 2023-07-14

## 2023-07-14 PROBLEM — H90.3 SENSORINEURAL HEARING LOSS, BILATERAL: Status: ACTIVE | Noted: 2020-10-13

## 2023-07-14 PROBLEM — E83.51 HYPOCALCEMIA: Status: ACTIVE | Noted: 2023-07-14

## 2023-07-14 RX ORDER — LIDOCAINE 4 G/G
1 PATCH TOPICAL DAILY
COMMUNITY

## 2023-07-14 RX ORDER — BUMETANIDE 2 MG/1
2 TABLET ORAL DAILY
COMMUNITY

## 2023-07-14 RX ORDER — CYCLOBENZAPRINE HCL 5 MG
5 TABLET ORAL 2 TIMES DAILY PRN
COMMUNITY

## 2023-07-14 NOTE — PATIENT INSTRUCTIONS
No medication changes at this time. Continue remote monitoring of pacemaker every 91 days. Follow-up with Dr Luiza Nichols office in 1 year.

## 2023-10-14 PROCEDURE — 93296 REM INTERROG EVL PM/IDS: CPT | Performed by: STUDENT IN AN ORGANIZED HEALTH CARE EDUCATION/TRAINING PROGRAM

## 2023-10-14 PROCEDURE — 93294 REM INTERROG EVL PM/LDLS PM: CPT | Performed by: STUDENT IN AN ORGANIZED HEALTH CARE EDUCATION/TRAINING PROGRAM

## 2024-01-13 PROCEDURE — 93294 REM INTERROG EVL PM/LDLS PM: CPT | Performed by: STUDENT IN AN ORGANIZED HEALTH CARE EDUCATION/TRAINING PROGRAM

## 2024-01-13 PROCEDURE — 93296 REM INTERROG EVL PM/IDS: CPT | Performed by: STUDENT IN AN ORGANIZED HEALTH CARE EDUCATION/TRAINING PROGRAM

## 2024-04-26 ENCOUNTER — OFFICE VISIT (OUTPATIENT)
Dept: NON INVASIVE DIAGNOSTICS | Age: 79
End: 2024-04-26
Payer: MEDICARE

## 2024-04-26 VITALS
HEIGHT: 67 IN | DIASTOLIC BLOOD PRESSURE: 60 MMHG | WEIGHT: 208 LBS | RESPIRATION RATE: 16 BRPM | BODY MASS INDEX: 32.65 KG/M2 | HEART RATE: 62 BPM | SYSTOLIC BLOOD PRESSURE: 100 MMHG

## 2024-04-26 DIAGNOSIS — I48.0 PAROXYSMAL ATRIAL FIBRILLATION (HCC): Primary | ICD-10-CM

## 2024-04-26 PROCEDURE — 99214 OFFICE O/P EST MOD 30 MIN: CPT | Performed by: STUDENT IN AN ORGANIZED HEALTH CARE EDUCATION/TRAINING PROGRAM

## 2024-04-26 PROCEDURE — 3078F DIAST BP <80 MM HG: CPT | Performed by: STUDENT IN AN ORGANIZED HEALTH CARE EDUCATION/TRAINING PROGRAM

## 2024-04-26 PROCEDURE — 93000 ELECTROCARDIOGRAM COMPLETE: CPT | Performed by: STUDENT IN AN ORGANIZED HEALTH CARE EDUCATION/TRAINING PROGRAM

## 2024-04-26 PROCEDURE — G8400 PT W/DXA NO RESULTS DOC: HCPCS | Performed by: STUDENT IN AN ORGANIZED HEALTH CARE EDUCATION/TRAINING PROGRAM

## 2024-04-26 PROCEDURE — 3074F SYST BP LT 130 MM HG: CPT | Performed by: STUDENT IN AN ORGANIZED HEALTH CARE EDUCATION/TRAINING PROGRAM

## 2024-04-26 PROCEDURE — G8427 DOCREV CUR MEDS BY ELIG CLIN: HCPCS | Performed by: STUDENT IN AN ORGANIZED HEALTH CARE EDUCATION/TRAINING PROGRAM

## 2024-04-26 PROCEDURE — 1123F ACP DISCUSS/DSCN MKR DOCD: CPT | Performed by: STUDENT IN AN ORGANIZED HEALTH CARE EDUCATION/TRAINING PROGRAM

## 2024-04-26 PROCEDURE — 1090F PRES/ABSN URINE INCON ASSESS: CPT | Performed by: STUDENT IN AN ORGANIZED HEALTH CARE EDUCATION/TRAINING PROGRAM

## 2024-04-26 PROCEDURE — G8417 CALC BMI ABV UP PARAM F/U: HCPCS | Performed by: STUDENT IN AN ORGANIZED HEALTH CARE EDUCATION/TRAINING PROGRAM

## 2024-04-26 PROCEDURE — 1036F TOBACCO NON-USER: CPT | Performed by: STUDENT IN AN ORGANIZED HEALTH CARE EDUCATION/TRAINING PROGRAM

## 2024-04-26 RX ORDER — BUDESONIDE 0.5 MG/2ML
1 INHALANT ORAL 2 TIMES DAILY
COMMUNITY

## 2024-04-26 NOTE — PATIENT INSTRUCTIONS
STOP flecainide.  Continue remote monitoring of pacemaker every 91 days.  Follow-up with this office in ~1 year.

## 2024-04-26 NOTE — PROGRESS NOTES
tobacco: Never   Substance Use Topics    Alcohol use: No       Current Outpatient Medications   Medication Sig Dispense Refill    budesonide (PULMICORT) 0.5 MG/2ML nebulizer suspension Take 2 mLs by nebulization 2 times daily      insulin NPH (HUMULIN N;NOVOLIN N) 100 UNIT/ML injection vial Inject into the skin 2 times daily (before meals)      arformoterol tartrate 15 MCG/2ML NEBU 15 mcg, budesonide 0.25 MG/2ML SUSP 250 mcg Take by nebulization 2 times daily      vitamin D (CHOLECALCIFEROL) 25 MCG (1000 UT) TABS tablet Take 1 tablet by mouth daily      bumetanide (BUMEX) 2 MG tablet Take 1 tablet by mouth daily      cyclobenzaprine (FLEXERIL) 5 MG tablet Take 1 tablet by mouth 2 times daily as needed for Muscle spasms      warfarin (COUMADIN) 3 MG tablet       magnesium hydroxide (MILK OF MAGNESIA) 400 MG/5ML suspension Take by mouth daily as needed for Constipation      camphor-menthol-methyl salicylate (BENGAY ULTRA STRENGTH) 4-10-30 % CREA cream Apply topically 3 times daily as needed for Pain      menthol-methyl salicylate (BENGAY ARTHRITIS) 8-30 % CREA cream Apply 1 applicator topically every 6 hours as needed for Pain      gabapentin (NEURONTIN) 300 MG capsule Take 1 capsule by mouth 2 times daily.      loratadine (CLARITIN) 10 MG capsule Take 1 capsule by mouth daily      acetaminophen (TYLENOL) 325 MG tablet 2 tablets by Per G Tube route every 4 hours as needed for Pain or Fever      insulin glargine (LANTUS) 100 UNIT/ML injection vial Inject 30 Units into the skin nightly (Patient taking differently: Inject 37 Units into the skin daily) 1 vial 3    flecainide (TAMBOCOR) 50 MG tablet Take 1 tablet by mouth 2 times daily 180 tablet 3    lovastatin (MEVACOR) 40 MG tablet 1 tablet by Per G Tube route nightly       No current facility-administered medications for this visit.        Allergies   Allergen Reactions    Amiodarone     Ibuprofen Hives    Red Dye Swelling     Tolerates red dye in medications per Is[facility]

## 2024-07-16 PROCEDURE — 93296 REM INTERROG EVL PM/IDS: CPT | Performed by: STUDENT IN AN ORGANIZED HEALTH CARE EDUCATION/TRAINING PROGRAM

## 2024-07-16 PROCEDURE — 93294 REM INTERROG EVL PM/LDLS PM: CPT | Performed by: STUDENT IN AN ORGANIZED HEALTH CARE EDUCATION/TRAINING PROGRAM

## 2024-07-17 ENCOUNTER — TELEPHONE (OUTPATIENT)
Dept: NON INVASIVE DIAGNOSTICS | Age: 79
End: 2024-07-17

## 2024-07-17 NOTE — TELEPHONE ENCOUNTER
----- Message from Cory Mccarthy DO sent at 7/16/2024  3:19 PM EDT -----  Regarding: remote  Remote in 1 week to reassess AF. If remains in AF, please arrange weekly INR. Once INR > 2 for each check for 3 weeks straight, then arrange DCCV.    -Cory Mccarthy DO

## 2024-07-17 NOTE — TELEPHONE ENCOUNTER
Facility aware, she has INR drawn through the facility (Dr Flores manages through [facility]).  Remote is set for 7/23/24.

## 2024-07-17 NOTE — TELEPHONE ENCOUNTER
Wireless remote scheduled for 7.23.2024.    Kely JUNIORN,RN   [facility] [place]   Device Clinic

## 2024-07-24 NOTE — TELEPHONE ENCOUNTER
Reviewed remote from 7.23.2024.  See murj.   Patient is back in NSR.     Kely REHMAN,RN   [facility] [place]   Device Clinic

## 2025-01-14 PROCEDURE — 93294 REM INTERROG EVL PM/LDLS PM: CPT | Performed by: STUDENT IN AN ORGANIZED HEALTH CARE EDUCATION/TRAINING PROGRAM

## 2025-01-14 PROCEDURE — 93296 REM INTERROG EVL PM/IDS: CPT | Performed by: STUDENT IN AN ORGANIZED HEALTH CARE EDUCATION/TRAINING PROGRAM
